# Patient Record
Sex: MALE | Race: ASIAN | Employment: FULL TIME | URBAN - METROPOLITAN AREA
[De-identification: names, ages, dates, MRNs, and addresses within clinical notes are randomized per-mention and may not be internally consistent; named-entity substitution may affect disease eponyms.]

---

## 2017-02-17 ENCOUNTER — HOSPITAL ENCOUNTER (EMERGENCY)
Facility: HOSPITAL | Age: 22
Discharge: HOME | End: 2017-02-17
Attending: EMERGENCY MEDICINE
Payer: COMMERCIAL

## 2017-02-17 VITALS
HEIGHT: 63 IN | DIASTOLIC BLOOD PRESSURE: 79 MMHG | BODY MASS INDEX: 30.48 KG/M2 | WEIGHT: 172 LBS | HEART RATE: 82 BPM | SYSTOLIC BLOOD PRESSURE: 136 MMHG | TEMPERATURE: 98 F | RESPIRATION RATE: 16 BRPM | OXYGEN SATURATION: 100 %

## 2017-02-17 DIAGNOSIS — S61.219A FINGER LACERATION, INITIAL ENCOUNTER: Primary | ICD-10-CM

## 2017-02-17 PROCEDURE — 99283 EMERGENCY DEPT VISIT LOW MDM: CPT

## 2017-02-17 PROCEDURE — 12002 RPR S/N/AX/GEN/TRNK2.6-7.5CM: CPT

## 2017-02-17 RX ORDER — IBUPROFEN 800 MG/1
800 TABLET ORAL ONCE
Status: COMPLETED | OUTPATIENT
Start: 2017-02-17 | End: 2017-02-17

## 2017-02-17 NOTE — ED PROVIDER NOTES
Patient Seen in: [facility] Emergency Department    History   Patient presents with:  Laceration Abrasion (integumentary)    Stated Complaint:     HPI    Patient is a 40-year-old who cut his left thumb with a kitchen knife this afternoon.   Patient i Well perfused, without cyanosis. No rashes. NEUROLOGIC: Cranial nerves II through XII are intact moving all extremities normally. No focal deficits visualized.         ED Course   Labs Reviewed - No data to display    MDM   PROCEDURE NOTE: LACERATION REP

## 2017-02-27 PROBLEM — S61.012A: Status: ACTIVE | Noted: 2017-02-27

## 2017-03-02 PROBLEM — S61.012A: Status: RESOLVED | Noted: 2017-02-27 | Resolved: 2017-03-02

## 2017-03-02 PROBLEM — S61.012D: Status: ACTIVE | Noted: 2017-03-02

## 2017-07-07 ENCOUNTER — LAB ENCOUNTER (OUTPATIENT)
Dept: LAB | Facility: HOSPITAL | Age: 22
End: 2017-07-07
Attending: SPECIALIST
Payer: COMMERCIAL

## 2017-07-07 DIAGNOSIS — Z00.00 ROUTINE GENERAL MEDICAL EXAMINATION AT A HEALTH CARE FACILITY: Primary | ICD-10-CM

## 2017-07-07 LAB
25-HYDROXYVITAMIN D (TOTAL): 23.5 NG/ML (ref 30–100)
ALBUMIN SERPL-MCNC: 4 G/DL (ref 3.5–4.8)
ALP LIVER SERPL-CCNC: 67 U/L (ref 45–117)
ALT SERPL-CCNC: 112 U/L (ref 17–63)
AST SERPL-CCNC: 33 U/L (ref 15–41)
BASOPHILS # BLD AUTO: 0.08 X10(3) UL (ref 0–0.1)
BASOPHILS NFR BLD AUTO: 1 %
BILIRUB SERPL-MCNC: 0.4 MG/DL (ref 0.1–2)
BILIRUB UR QL STRIP.AUTO: NEGATIVE
BUN BLD-MCNC: 16 MG/DL (ref 8–20)
CALCIUM BLD-MCNC: 9 MG/DL (ref 8.3–10.3)
CHLORIDE: 107 MMOL/L (ref 101–111)
CHOLEST SMN-MCNC: 177 MG/DL (ref ?–190)
CLARITY UR REFRACT.AUTO: CLEAR
CO2: 25 MMOL/L (ref 22–32)
COLOR UR AUTO: YELLOW
CREAT BLD-MCNC: 1.33 MG/DL (ref 0.7–1.3)
EOSINOPHIL # BLD AUTO: 0.59 X10(3) UL (ref 0–0.3)
EOSINOPHIL NFR BLD AUTO: 7.6 %
ERYTHROCYTE [DISTWIDTH] IN BLOOD BY AUTOMATED COUNT: 12.1 % (ref 11.5–16)
EST. AVERAGE GLUCOSE BLD GHB EST-MCNC: 114 MG/DL (ref 68–126)
GLUCOSE BLD-MCNC: 81 MG/DL (ref 70–99)
GLUCOSE UR STRIP.AUTO-MCNC: NEGATIVE MG/DL
HAV AB SERPL IA-ACNC: 481 PG/ML (ref 193–986)
HBA1C MFR BLD HPLC: 5.6 % (ref ?–5.7)
HCT VFR BLD AUTO: 44.5 % (ref 37–53)
HDLC SERPL-MCNC: 55 MG/DL (ref 45–?)
HDLC SERPL: 3.22 {RATIO} (ref ?–4.97)
HGB BLD-MCNC: 15 G/DL (ref 13–17)
IMMATURE GRANULOCYTE COUNT: 0.05 X10(3) UL (ref 0–1)
IMMATURE GRANULOCYTE RATIO %: 0.6 %
KETONES UR STRIP.AUTO-MCNC: NEGATIVE MG/DL
LDLC SERPL CALC-MCNC: 109 MG/DL (ref ?–120)
LEUKOCYTE ESTERASE UR QL STRIP.AUTO: NEGATIVE
LYMPHOCYTES # BLD AUTO: 2.37 X10(3) UL (ref 0.9–4)
LYMPHOCYTES NFR BLD AUTO: 30.5 %
M PROTEIN MFR SERPL ELPH: 8.1 G/DL (ref 6.1–8.3)
MCH RBC QN AUTO: 29.2 PG (ref 27–33.2)
MCHC RBC AUTO-ENTMCNC: 33.7 G/DL (ref 31–37)
MCV RBC AUTO: 86.7 FL (ref 80–99)
MONOCYTES # BLD AUTO: 0.75 X10(3) UL (ref 0.1–0.6)
MONOCYTES NFR BLD AUTO: 9.7 %
NEUTROPHIL ABS PRELIM: 3.92 X10 (3) UL (ref 1.3–6.7)
NEUTROPHILS # BLD AUTO: 3.92 X10(3) UL (ref 1.3–6.7)
NEUTROPHILS NFR BLD AUTO: 50.6 %
NITRITE UR QL STRIP.AUTO: NEGATIVE
NONHDLC SERPL-MCNC: 122 MG/DL (ref ?–150)
PH UR STRIP.AUTO: 5 [PH] (ref 4.5–8)
PLATELET # BLD AUTO: 368 10(3)UL (ref 150–450)
POTASSIUM SERPL-SCNC: 3.9 MMOL/L (ref 3.6–5.1)
PROT UR STRIP.AUTO-MCNC: NEGATIVE MG/DL
RBC # BLD AUTO: 5.13 X10(6)UL (ref 4.3–5.7)
RBC UR QL AUTO: NEGATIVE
RED CELL DISTRIBUTION WIDTH-SD: 38.5 FL (ref 35.1–46.3)
SODIUM SERPL-SCNC: 141 MMOL/L (ref 136–144)
SP GR UR STRIP.AUTO: 1.01 (ref 1–1.03)
THYROXINE (T4): 12.1 UG/DL (ref 4.5–10.9)
TRIGLYCERIDES: 67 MG/DL (ref ?–115)
TSI SER-ACNC: 2.29 MIU/ML (ref 0.35–5.5)
UROBILINOGEN UR STRIP.AUTO-MCNC: <2 MG/DL
VLDL: 13 MG/DL (ref 5–40)
WBC # BLD AUTO: 7.8 X10(3) UL (ref 4–13)

## 2017-07-07 PROCEDURE — 82306 VITAMIN D 25 HYDROXY: CPT

## 2017-07-07 PROCEDURE — 84443 ASSAY THYROID STIM HORMONE: CPT

## 2017-07-07 PROCEDURE — 80053 COMPREHEN METABOLIC PANEL: CPT

## 2017-07-07 PROCEDURE — 85025 COMPLETE CBC W/AUTO DIFF WBC: CPT

## 2017-07-07 PROCEDURE — 80061 LIPID PANEL: CPT

## 2017-07-07 PROCEDURE — 36415 COLL VENOUS BLD VENIPUNCTURE: CPT

## 2017-07-07 PROCEDURE — 84436 ASSAY OF TOTAL THYROXINE: CPT

## 2017-07-07 PROCEDURE — 83036 HEMOGLOBIN GLYCOSYLATED A1C: CPT

## 2017-07-07 PROCEDURE — 81003 URINALYSIS AUTO W/O SCOPE: CPT

## 2017-07-07 PROCEDURE — 82607 VITAMIN B-12: CPT

## 2017-07-07 PROCEDURE — 87086 URINE CULTURE/COLONY COUNT: CPT

## 2017-07-28 ENCOUNTER — LAB ENCOUNTER (OUTPATIENT)
Dept: LAB | Facility: HOSPITAL | Age: 22
End: 2017-07-28
Attending: SPECIALIST
Payer: COMMERCIAL

## 2017-07-28 DIAGNOSIS — G73.7 MYOPATHY IN ENDOCRINE DISEASES CLASSIFIED ELSEWHERE(359.5): ICD-10-CM

## 2017-07-28 DIAGNOSIS — E34.9 MYOPATHY IN ENDOCRINE DISEASES CLASSIFIED ELSEWHERE(359.5): ICD-10-CM

## 2017-07-28 DIAGNOSIS — R79.9 OTHER NONSPECIFIC FINDINGS ON EXAMINATION OF BLOOD(790.99): Primary | ICD-10-CM

## 2017-07-28 DIAGNOSIS — E55.9 AVITAMINOSIS D: ICD-10-CM

## 2017-07-28 LAB
25-HYDROXYVITAMIN D (TOTAL): 28.4 NG/ML (ref 30–100)
ALBUMIN SERPL-MCNC: 3.9 G/DL (ref 3.5–4.8)
ALP LIVER SERPL-CCNC: 68 U/L (ref 45–117)
ALT SERPL-CCNC: 85 U/L (ref 17–63)
ANTI-THYROPEROXIDASE: <28 U/ML (ref ?–60)
AST SERPL-CCNC: 31 U/L (ref 15–41)
BASOPHILS # BLD AUTO: 0.1 X10(3) UL (ref 0–0.1)
BASOPHILS NFR BLD AUTO: 1.4 %
BILIRUB SERPL-MCNC: 0.4 MG/DL (ref 0.1–2)
BUN BLD-MCNC: 20 MG/DL (ref 8–20)
C-REACTIVE PROTEIN: <0.29 MG/DL (ref ?–1)
CALCIUM BLD-MCNC: 9.2 MG/DL (ref 8.3–10.3)
CHLORIDE: 105 MMOL/L (ref 101–111)
CO2: 28 MMOL/L (ref 22–32)
CREAT BLD-MCNC: 1.35 MG/DL (ref 0.7–1.3)
EOSINOPHIL # BLD AUTO: 0.7 X10(3) UL (ref 0–0.3)
EOSINOPHIL NFR BLD AUTO: 9.7 %
ERYTHROCYTE [DISTWIDTH] IN BLOOD BY AUTOMATED COUNT: 11.9 % (ref 11.5–16)
GLUCOSE BLD-MCNC: 84 MG/DL (ref 70–99)
HAV AB SERPL IA-ACNC: 644 PG/ML (ref 193–986)
HCT VFR BLD AUTO: 47.9 % (ref 37–53)
HGB BLD-MCNC: 15.9 G/DL (ref 13–17)
IMMATURE GRANULOCYTE COUNT: 0.02 X10(3) UL (ref 0–1)
IMMATURE GRANULOCYTE RATIO %: 0.3 %
LYMPHOCYTES # BLD AUTO: 2.14 X10(3) UL (ref 0.9–4)
LYMPHOCYTES NFR BLD AUTO: 29.5 %
M PROTEIN MFR SERPL ELPH: 8.1 G/DL (ref 6.1–8.3)
MCH RBC QN AUTO: 28.5 PG (ref 27–33.2)
MCHC RBC AUTO-ENTMCNC: 33.2 G/DL (ref 31–37)
MCV RBC AUTO: 85.8 FL (ref 80–99)
MONOCYTES # BLD AUTO: 0.68 X10(3) UL (ref 0.1–0.6)
MONOCYTES NFR BLD AUTO: 9.4 %
NEUTROPHIL ABS PRELIM: 3.61 X10 (3) UL (ref 1.3–6.7)
NEUTROPHILS # BLD AUTO: 3.61 X10(3) UL (ref 1.3–6.7)
NEUTROPHILS NFR BLD AUTO: 49.7 %
PLATELET # BLD AUTO: 369 10(3)UL (ref 150–450)
POTASSIUM SERPL-SCNC: 4 MMOL/L (ref 3.6–5.1)
RBC # BLD AUTO: 5.58 X10(6)UL (ref 4.3–5.7)
RED CELL DISTRIBUTION WIDTH-SD: 37.5 FL (ref 35.1–46.3)
SODIUM SERPL-SCNC: 140 MMOL/L (ref 136–144)
TSI SER-ACNC: 0.96 MIU/ML (ref 0.35–5.5)
WBC # BLD AUTO: 7.3 X10(3) UL (ref 4–13)

## 2017-07-28 PROCEDURE — 80053 COMPREHEN METABOLIC PANEL: CPT

## 2017-07-28 PROCEDURE — 84443 ASSAY THYROID STIM HORMONE: CPT

## 2017-07-28 PROCEDURE — 36415 COLL VENOUS BLD VENIPUNCTURE: CPT

## 2017-07-28 PROCEDURE — 82607 VITAMIN B-12: CPT

## 2017-07-28 PROCEDURE — 85025 COMPLETE CBC W/AUTO DIFF WBC: CPT

## 2017-07-28 PROCEDURE — 86376 MICROSOMAL ANTIBODY EACH: CPT

## 2017-07-28 PROCEDURE — 82306 VITAMIN D 25 HYDROXY: CPT

## 2017-07-28 PROCEDURE — 86140 C-REACTIVE PROTEIN: CPT

## 2018-11-20 ENCOUNTER — IMMUNIZATION (OUTPATIENT)
Dept: FAMILY MEDICINE CLINIC | Facility: CLINIC | Age: 23
End: 2018-11-20
Payer: COMMERCIAL

## 2018-11-20 DIAGNOSIS — Z23 NEED FOR VACCINATION: ICD-10-CM

## 2018-11-20 PROCEDURE — 90471 IMMUNIZATION ADMIN: CPT | Performed by: PHYSICIAN ASSISTANT

## 2018-11-20 PROCEDURE — 90686 IIV4 VACC NO PRSV 0.5 ML IM: CPT | Performed by: PHYSICIAN ASSISTANT

## 2019-05-23 ENCOUNTER — APPOINTMENT (OUTPATIENT)
Dept: GENERAL RADIOLOGY | Age: 24
End: 2019-05-23
Attending: EMERGENCY MEDICINE
Payer: COMMERCIAL

## 2019-05-23 ENCOUNTER — HOSPITAL ENCOUNTER (OUTPATIENT)
Age: 24
Discharge: HOME | End: 2019-05-23
Attending: EMERGENCY MEDICINE
Payer: COMMERCIAL

## 2019-05-23 VITALS
SYSTOLIC BLOOD PRESSURE: 124 MMHG | OXYGEN SATURATION: 97 % | TEMPERATURE: 98 F | DIASTOLIC BLOOD PRESSURE: 69 MMHG | HEART RATE: 87 BPM | RESPIRATION RATE: 16 BRPM

## 2019-05-23 DIAGNOSIS — S63.502A SPRAIN OF LEFT WRIST, INITIAL ENCOUNTER: Primary | ICD-10-CM

## 2019-05-23 PROCEDURE — 99213 OFFICE O/P EST LOW 20 MIN: CPT

## 2019-05-23 PROCEDURE — 73110 X-RAY EXAM OF WRIST: CPT | Performed by: EMERGENCY MEDICINE
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
## 2019-05-30 ENCOUNTER — LAB ENCOUNTER (OUTPATIENT)
Dept: LAB | Facility: HOSPITAL | Age: 24
End: 2019-05-30
Attending: SPECIALIST
Payer: COMMERCIAL

## 2019-05-30 DIAGNOSIS — R79.9 ABNORMAL BLOOD CHEMISTRY: ICD-10-CM

## 2019-05-30 DIAGNOSIS — E55.9 AVITAMINOSIS D: ICD-10-CM

## 2019-05-30 DIAGNOSIS — R53.81 DEBILITY: ICD-10-CM

## 2019-05-30 DIAGNOSIS — Z00.00 ROUTINE GENERAL MEDICAL EXAMINATION AT A HEALTH CARE FACILITY: Primary | ICD-10-CM

## 2019-05-30 PROCEDURE — 36415 COLL VENOUS BLD VENIPUNCTURE: CPT

## 2019-05-30 PROCEDURE — 84436 ASSAY OF TOTAL THYROXINE: CPT

## 2019-05-30 PROCEDURE — 81001 URINALYSIS AUTO W/SCOPE: CPT

## 2019-05-30 PROCEDURE — 82607 VITAMIN B-12: CPT

## 2019-05-30 PROCEDURE — 87086 URINE CULTURE/COLONY COUNT: CPT

## 2019-05-30 PROCEDURE — 82746 ASSAY OF FOLIC ACID SERUM: CPT

## 2019-05-30 PROCEDURE — 83036 HEMOGLOBIN GLYCOSYLATED A1C: CPT

## 2019-05-30 PROCEDURE — 80053 COMPREHEN METABOLIC PANEL: CPT

## 2019-05-30 PROCEDURE — 82306 VITAMIN D 25 HYDROXY: CPT

## 2019-05-30 PROCEDURE — 84443 ASSAY THYROID STIM HORMONE: CPT

## 2019-05-30 PROCEDURE — 85025 COMPLETE CBC W/AUTO DIFF WBC: CPT

## 2019-05-30 PROCEDURE — 80061 LIPID PANEL: CPT

## 2019-06-04 ENCOUNTER — HOSPITAL ENCOUNTER (OUTPATIENT)
Dept: MRI IMAGING | Facility: HOSPITAL | Age: 24
Discharge: HOME | End: 2019-06-04
Attending: SPECIALIST
Payer: COMMERCIAL

## 2019-06-04 DIAGNOSIS — M25.532 LEFT WRIST PAIN: ICD-10-CM

## 2019-06-04 PROCEDURE — 73221 MRI JOINT UPR EXTREM W/O DYE: CPT | Performed by: SPECIALIST

## 2019-08-08 ENCOUNTER — HOSPITAL ENCOUNTER (OUTPATIENT)
Dept: GENERAL RADIOLOGY | Facility: HOSPITAL | Age: 24
Discharge: HOME | End: 2019-08-08
Attending: SPECIALIST
Payer: COMMERCIAL

## 2019-08-08 DIAGNOSIS — M25.562 BILATERAL KNEE PAIN: ICD-10-CM

## 2019-08-08 DIAGNOSIS — M25.561 BILATERAL KNEE PAIN: ICD-10-CM

## 2019-08-08 PROCEDURE — 73560 X-RAY EXAM OF KNEE 1 OR 2: CPT | Performed by: SPECIALIST

## 2020-06-04 ENCOUNTER — HOSPITAL ENCOUNTER (OUTPATIENT)
Dept: ULTRASOUND IMAGING | Age: 25
Discharge: HOME | End: 2020-06-04
Attending: SPECIALIST
Payer: COMMERCIAL

## 2020-06-04 DIAGNOSIS — N28.9 RENAL INSUFFICIENCY: ICD-10-CM

## 2020-06-04 PROCEDURE — 76770 US EXAM ABDO BACK WALL COMP: CPT | Performed by: SPECIALIST

## 2020-07-17 ENCOUNTER — HOSPITAL ENCOUNTER (OUTPATIENT)
Dept: ULTRASOUND IMAGING | Age: 25
Discharge: HOME | End: 2020-07-17
Attending: SPECIALIST
Payer: COMMERCIAL

## 2020-07-17 ENCOUNTER — LAB ENCOUNTER (OUTPATIENT)
Dept: LAB | Age: 25
End: 2020-07-17
Attending: SPECIALIST
Payer: COMMERCIAL

## 2020-07-17 DIAGNOSIS — Z00.00 ROUTINE GENERAL MEDICAL EXAMINATION AT A HEALTH CARE FACILITY: Primary | ICD-10-CM

## 2020-07-17 DIAGNOSIS — N13.30 HYDRONEPHROSIS, UNSPECIFIED HYDRONEPHROSIS TYPE: ICD-10-CM

## 2020-07-17 LAB
ALBUMIN SERPL-MCNC: 3.7 G/DL (ref 3.4–5)
ALBUMIN/GLOB SERPL: 0.9 {RATIO} (ref 1–2)
ALP LIVER SERPL-CCNC: 56 U/L (ref 45–117)
ALT SERPL-CCNC: 55 U/L (ref 16–61)
ANION GAP SERPL CALC-SCNC: 1 MMOL/L (ref 0–18)
AST SERPL-CCNC: 29 U/L (ref 15–37)
BASOPHILS # BLD AUTO: 0.08 X10(3) UL (ref 0–0.2)
BASOPHILS NFR BLD AUTO: 1.2 %
BILIRUB SERPL-MCNC: 0.5 MG/DL (ref 0.1–2)
BILIRUB UR QL STRIP.AUTO: NEGATIVE
BUN BLD-MCNC: 14 MG/DL (ref 7–18)
BUN/CREAT SERPL: 12.3 (ref 10–20)
CALCIUM BLD-MCNC: 8.9 MG/DL (ref 8.5–10.1)
CHLORIDE SERPL-SCNC: 107 MMOL/L (ref 98–112)
CHOLEST SMN-MCNC: 174 MG/DL (ref ?–200)
CLARITY UR REFRACT.AUTO: CLEAR
CO2 SERPL-SCNC: 29 MMOL/L (ref 21–32)
CREAT BLD-MCNC: 1.14 MG/DL (ref 0.7–1.3)
DEPRECATED RDW RBC AUTO: 42.7 FL (ref 35.1–46.3)
EOSINOPHIL # BLD AUTO: 0.83 X10(3) UL (ref 0–0.7)
EOSINOPHIL NFR BLD AUTO: 12.5 %
ERYTHROCYTE [DISTWIDTH] IN BLOOD BY AUTOMATED COUNT: 12.9 % (ref 11–15)
EST. AVERAGE GLUCOSE BLD GHB EST-MCNC: 103 MG/DL (ref 68–126)
FOLATE SERPL-MCNC: 19.4 NG/ML (ref 8.7–?)
GLOBULIN PLAS-MCNC: 3.9 G/DL (ref 2.8–4.4)
GLUCOSE BLD-MCNC: 85 MG/DL (ref 70–99)
GLUCOSE UR STRIP.AUTO-MCNC: NEGATIVE MG/DL
HBA1C MFR BLD HPLC: 5.2 % (ref ?–5.7)
HCT VFR BLD AUTO: 47.3 % (ref 39–53)
HDLC SERPL-MCNC: 55 MG/DL (ref 40–59)
HGB BLD-MCNC: 15.1 G/DL (ref 13–17.5)
IMM GRANULOCYTES # BLD AUTO: 0.02 X10(3) UL (ref 0–1)
IMM GRANULOCYTES NFR BLD: 0.3 %
KETONES UR STRIP.AUTO-MCNC: NEGATIVE MG/DL
LDLC SERPL CALC-MCNC: 104 MG/DL (ref ?–100)
LEUKOCYTE ESTERASE UR QL STRIP.AUTO: NEGATIVE
LYMPHOCYTES # BLD AUTO: 1.84 X10(3) UL (ref 1–4)
LYMPHOCYTES NFR BLD AUTO: 27.7 %
M PROTEIN MFR SERPL ELPH: 7.6 G/DL (ref 6.4–8.2)
MCH RBC QN AUTO: 28.7 PG (ref 26–34)
MCHC RBC AUTO-ENTMCNC: 31.9 G/DL (ref 31–37)
MCV RBC AUTO: 89.9 FL (ref 80–100)
MONOCYTES # BLD AUTO: 0.58 X10(3) UL (ref 0.1–1)
MONOCYTES NFR BLD AUTO: 8.7 %
NEUTROPHILS # BLD AUTO: 3.3 X10 (3) UL (ref 1.5–7.7)
NEUTROPHILS # BLD AUTO: 3.3 X10(3) UL (ref 1.5–7.7)
NEUTROPHILS NFR BLD AUTO: 49.6 %
NITRITE UR QL STRIP.AUTO: NEGATIVE
NONHDLC SERPL-MCNC: 119 MG/DL (ref ?–130)
OSMOLALITY SERPL CALC.SUM OF ELEC: 284 MOSM/KG (ref 275–295)
PATIENT FASTING Y/N/NP: YES
PATIENT FASTING Y/N/NP: YES
PH UR STRIP.AUTO: 8 [PH] (ref 4.5–8)
PLATELET # BLD AUTO: 335 10(3)UL (ref 150–450)
POTASSIUM SERPL-SCNC: 4.1 MMOL/L (ref 3.5–5.1)
PROT UR STRIP.AUTO-MCNC: NEGATIVE MG/DL
RBC # BLD AUTO: 5.26 X10(6)UL (ref 4.3–5.7)
RBC UR QL AUTO: NEGATIVE
SED RATE-ML: 9 MM/HR (ref 0–12)
SODIUM SERPL-SCNC: 137 MMOL/L (ref 136–145)
SP GR UR STRIP.AUTO: 1.01 (ref 1–1.03)
T4 SERPL-MCNC: 9.7 UG/DL (ref 4.5–12.1)
TRIGL SERPL-MCNC: 73 MG/DL (ref 30–149)
TSI SER-ACNC: 1.14 MIU/ML (ref 0.36–3.74)
UROBILINOGEN UR STRIP.AUTO-MCNC: <2 MG/DL
VIT B12 SERPL-MCNC: 435 PG/ML (ref 193–986)
VIT D+METAB SERPL-MCNC: 31.8 NG/ML (ref 30–100)
VLDLC SERPL CALC-MCNC: 15 MG/DL (ref 0–30)
WBC # BLD AUTO: 6.7 X10(3) UL (ref 4–11)

## 2020-07-17 PROCEDURE — 80053 COMPREHEN METABOLIC PANEL: CPT

## 2020-07-17 PROCEDURE — 83036 HEMOGLOBIN GLYCOSYLATED A1C: CPT

## 2020-07-17 PROCEDURE — 85025 COMPLETE CBC W/AUTO DIFF WBC: CPT

## 2020-07-17 PROCEDURE — 81003 URINALYSIS AUTO W/O SCOPE: CPT

## 2020-07-17 PROCEDURE — 82746 ASSAY OF FOLIC ACID SERUM: CPT

## 2020-07-17 PROCEDURE — 85652 RBC SED RATE AUTOMATED: CPT

## 2020-07-17 PROCEDURE — 76770 US EXAM ABDO BACK WALL COMP: CPT | Performed by: SPECIALIST

## 2020-07-17 PROCEDURE — 84443 ASSAY THYROID STIM HORMONE: CPT

## 2020-07-17 PROCEDURE — 84436 ASSAY OF TOTAL THYROXINE: CPT

## 2020-07-17 PROCEDURE — 82306 VITAMIN D 25 HYDROXY: CPT

## 2020-07-17 PROCEDURE — 87086 URINE CULTURE/COLONY COUNT: CPT

## 2020-07-17 PROCEDURE — 80061 LIPID PANEL: CPT

## 2020-07-17 PROCEDURE — 36415 COLL VENOUS BLD VENIPUNCTURE: CPT

## 2020-07-17 PROCEDURE — 82607 VITAMIN B-12: CPT

## 2020-07-27 ENCOUNTER — HOSPITAL ENCOUNTER (OUTPATIENT)
Dept: CT IMAGING | Age: 25
Discharge: HOME | End: 2020-07-27
Attending: UROLOGY
Payer: COMMERCIAL

## 2020-07-27 DIAGNOSIS — N13.30 HYDRONEPHROSIS, RIGHT: ICD-10-CM

## 2020-07-27 PROCEDURE — 74178 CT ABD&PLV WO CNTR FLWD CNTR: CPT | Performed by: UROLOGY

## 2020-07-27 PROCEDURE — 76377 3D RENDER W/INTRP POSTPROCES: CPT | Performed by: UROLOGY

## 2020-08-01 ENCOUNTER — TELEPHONE (OUTPATIENT)
Dept: INTERNAL MEDICINE CLINIC | Facility: HOSPITAL | Age: 25
End: 2020-08-01

## 2020-08-01 ENCOUNTER — TELEPHONE (OUTPATIENT)
Dept: OTHER | Facility: HOSPITAL | Age: 25
End: 2020-08-01

## 2020-08-01 ENCOUNTER — LAB ENCOUNTER (OUTPATIENT)
Dept: LAB | Facility: HOSPITAL | Age: 25
End: 2020-08-01
Attending: PREVENTIVE MEDICINE
Payer: COMMERCIAL

## 2020-08-01 DIAGNOSIS — Z20.822 SUSPECTED COVID-19 VIRUS INFECTION: ICD-10-CM

## 2020-08-01 DIAGNOSIS — Z20.822 SUSPECTED COVID-19 VIRUS INFECTION: Primary | ICD-10-CM

## 2020-08-01 LAB — SARS-COV-2 RNA RESP QL NAA+PROBE: DETECTED

## 2020-08-01 NOTE — TELEPHONE ENCOUNTER
Employee notified of Detected Covid results - Voiced understanding  Has headaches sore throat . Advise to seek treatment if worsening symptoms or shortness of breath , chest pain voices understanding  Advise masking , isolation, separate bathrooms. Schedul

## 2020-08-03 NOTE — IMMEDIATE CARE/WORKERS COMP PHYSICIAN REPORT
Called employee (verified by 2 identifiers) to give POSITIVE results of COVID test that were reviewed by Dr. Jesus Davis.     Per [facility] positive COVID guidelines, employee informed of the following per [facility] guidelines:    • Employee advised to communicate

## 2020-09-14 ENCOUNTER — ORDER TRANSCRIPTION (OUTPATIENT)
Dept: ADMINISTRATIVE | Facility: HOSPITAL | Age: 25
End: 2020-09-14

## 2020-09-14 DIAGNOSIS — J40 BRONCHITIS: Primary | ICD-10-CM

## 2020-09-15 ENCOUNTER — HOSPITAL ENCOUNTER (OUTPATIENT)
Dept: GENERAL RADIOLOGY | Age: 25
Discharge: HOME | End: 2020-09-15
Attending: SPECIALIST
Payer: COMMERCIAL

## 2020-09-15 ENCOUNTER — LAB ENCOUNTER (OUTPATIENT)
Dept: LAB | Age: 25
End: 2020-09-15
Attending: SPECIALIST
Payer: COMMERCIAL

## 2020-09-15 ENCOUNTER — EKG ENCOUNTER (OUTPATIENT)
Dept: LAB | Age: 25
End: 2020-09-15
Attending: SPECIALIST
Payer: COMMERCIAL

## 2020-09-15 DIAGNOSIS — R79.9 ABNORMAL BLOOD CHEMISTRY: ICD-10-CM

## 2020-09-15 DIAGNOSIS — E55.9 AVITAMINOSIS D: ICD-10-CM

## 2020-09-15 DIAGNOSIS — J40 BRONCHITIS: ICD-10-CM

## 2020-09-15 DIAGNOSIS — R06.00 DYSPNEA: Primary | ICD-10-CM

## 2020-09-15 DIAGNOSIS — Z00.00 ROUTINE GENERAL MEDICAL EXAMINATION AT A HEALTH CARE FACILITY: Primary | ICD-10-CM

## 2020-09-15 DIAGNOSIS — R53.81 DEBILITY: ICD-10-CM

## 2020-09-15 LAB
ALBUMIN SERPL-MCNC: 3.9 G/DL (ref 3.4–5)
ALBUMIN/GLOB SERPL: 0.9 {RATIO} (ref 1–2)
ALP LIVER SERPL-CCNC: 57 U/L (ref 45–117)
ALT SERPL-CCNC: 37 U/L (ref 16–61)
ANION GAP SERPL CALC-SCNC: 5 MMOL/L (ref 0–18)
AST SERPL-CCNC: 20 U/L (ref 15–37)
ATRIAL RATE: 59 BPM
BASOPHILS # BLD AUTO: 0.08 X10(3) UL (ref 0–0.2)
BASOPHILS NFR BLD AUTO: 1.2 %
BILIRUB SERPL-MCNC: 0.8 MG/DL (ref 0.1–2)
BILIRUB UR QL STRIP.AUTO: NEGATIVE
BUN BLD-MCNC: 12 MG/DL (ref 7–18)
BUN/CREAT SERPL: 10.3 (ref 10–20)
CALCIUM BLD-MCNC: 9.2 MG/DL (ref 8.5–10.1)
CHLORIDE SERPL-SCNC: 104 MMOL/L (ref 98–112)
CHOLEST SMN-MCNC: 188 MG/DL (ref ?–200)
CLARITY UR REFRACT.AUTO: CLEAR
CO2 SERPL-SCNC: 28 MMOL/L (ref 21–32)
COLOR UR AUTO: YELLOW
CREAT BLD-MCNC: 1.17 MG/DL (ref 0.7–1.3)
DEPRECATED RDW RBC AUTO: 42.9 FL (ref 35.1–46.3)
EOSINOPHIL # BLD AUTO: 0.25 X10(3) UL (ref 0–0.7)
EOSINOPHIL NFR BLD AUTO: 3.9 %
ERYTHROCYTE [DISTWIDTH] IN BLOOD BY AUTOMATED COUNT: 12.5 % (ref 11–15)
EST. AVERAGE GLUCOSE BLD GHB EST-MCNC: 114 MG/DL (ref 68–126)
FOLATE SERPL-MCNC: 30.8 NG/ML (ref 8.7–?)
GLOBULIN PLAS-MCNC: 4.2 G/DL (ref 2.8–4.4)
GLUCOSE BLD-MCNC: 83 MG/DL (ref 70–99)
GLUCOSE UR STRIP.AUTO-MCNC: NEGATIVE MG/DL
HBA1C MFR BLD HPLC: 5.6 % (ref ?–5.7)
HCT VFR BLD AUTO: 49.7 % (ref 39–53)
HDLC SERPL-MCNC: 58 MG/DL (ref 40–59)
HGB BLD-MCNC: 15.4 G/DL (ref 13–17.5)
IMM GRANULOCYTES # BLD AUTO: 0.03 X10(3) UL (ref 0–1)
IMM GRANULOCYTES NFR BLD: 0.5 %
KETONES UR STRIP.AUTO-MCNC: 20 MG/DL
LDLC SERPL CALC-MCNC: 113 MG/DL (ref ?–100)
LEUKOCYTE ESTERASE UR QL STRIP.AUTO: NEGATIVE
LYMPHOCYTES # BLD AUTO: 1.73 X10(3) UL (ref 1–4)
LYMPHOCYTES NFR BLD AUTO: 26.8 %
M PROTEIN MFR SERPL ELPH: 8.1 G/DL (ref 6.4–8.2)
MCH RBC QN AUTO: 28.7 PG (ref 26–34)
MCHC RBC AUTO-ENTMCNC: 31 G/DL (ref 31–37)
MCV RBC AUTO: 92.6 FL (ref 80–100)
MONOCYTES # BLD AUTO: 0.58 X10(3) UL (ref 0.1–1)
MONOCYTES NFR BLD AUTO: 9 %
NEUTROPHILS # BLD AUTO: 3.78 X10 (3) UL (ref 1.5–7.7)
NEUTROPHILS # BLD AUTO: 3.78 X10(3) UL (ref 1.5–7.7)
NEUTROPHILS NFR BLD AUTO: 58.6 %
NITRITE UR QL STRIP.AUTO: NEGATIVE
NONHDLC SERPL-MCNC: 130 MG/DL (ref ?–130)
OSMOLALITY SERPL CALC.SUM OF ELEC: 283 MOSM/KG (ref 275–295)
P AXIS: 33 DEGREES
P-R INTERVAL: 136 MS
PATIENT FASTING Y/N/NP: YES
PATIENT FASTING Y/N/NP: YES
PH UR STRIP.AUTO: 6 [PH] (ref 4.5–8)
PLATELET # BLD AUTO: 385 10(3)UL (ref 150–450)
POTASSIUM SERPL-SCNC: 3.9 MMOL/L (ref 3.5–5.1)
PROT UR STRIP.AUTO-MCNC: NEGATIVE MG/DL
Q-T INTERVAL: 390 MS
QRS DURATION: 90 MS
QTC CALCULATION (BEZET): 386 MS
R AXIS: 57 DEGREES
RBC # BLD AUTO: 5.37 X10(6)UL (ref 4.3–5.7)
RBC UR QL AUTO: NEGATIVE
SODIUM SERPL-SCNC: 137 MMOL/L (ref 136–145)
SP GR UR STRIP.AUTO: 1.02 (ref 1–1.03)
T AXIS: 27 DEGREES
T4 SERPL-MCNC: 10.3 UG/DL (ref 4.5–12.1)
TRIGL SERPL-MCNC: 84 MG/DL (ref 30–149)
TSI SER-ACNC: 0.76 MIU/ML (ref 0.36–3.74)
UROBILINOGEN UR STRIP.AUTO-MCNC: <2 MG/DL
VENTRICULAR RATE: 59 BPM
VIT B12 SERPL-MCNC: 1009 PG/ML (ref 193–986)
VIT D+METAB SERPL-MCNC: 33.3 NG/ML (ref 30–100)
VLDLC SERPL CALC-MCNC: 17 MG/DL (ref 0–30)
WBC # BLD AUTO: 6.5 X10(3) UL (ref 4–11)

## 2020-09-15 PROCEDURE — 84443 ASSAY THYROID STIM HORMONE: CPT

## 2020-09-15 PROCEDURE — 81003 URINALYSIS AUTO W/O SCOPE: CPT

## 2020-09-15 PROCEDURE — 80061 LIPID PANEL: CPT

## 2020-09-15 PROCEDURE — 82306 VITAMIN D 25 HYDROXY: CPT

## 2020-09-15 PROCEDURE — 71046 X-RAY EXAM CHEST 2 VIEWS: CPT | Performed by: SPECIALIST

## 2020-09-15 PROCEDURE — 36415 COLL VENOUS BLD VENIPUNCTURE: CPT

## 2020-09-15 PROCEDURE — 85025 COMPLETE CBC W/AUTO DIFF WBC: CPT

## 2020-09-15 PROCEDURE — 80053 COMPREHEN METABOLIC PANEL: CPT

## 2020-09-15 PROCEDURE — 84436 ASSAY OF TOTAL THYROXINE: CPT

## 2020-09-15 PROCEDURE — 83036 HEMOGLOBIN GLYCOSYLATED A1C: CPT

## 2020-09-15 PROCEDURE — 82746 ASSAY OF FOLIC ACID SERUM: CPT

## 2020-09-15 PROCEDURE — 82607 VITAMIN B-12: CPT

## 2020-09-15 PROCEDURE — 93010 ELECTROCARDIOGRAM REPORT: CPT | Performed by: INTERNAL MEDICINE

## 2020-09-15 PROCEDURE — 93005 ELECTROCARDIOGRAM TRACING: CPT

## 2020-09-30 ENCOUNTER — LAB ENCOUNTER (OUTPATIENT)
Dept: LAB | Age: 25
End: 2020-09-30
Attending: SPECIALIST
Payer: COMMERCIAL

## 2020-09-30 ENCOUNTER — OFFICE VISIT (OUTPATIENT)
Dept: OCCUPATIONAL MEDICINE | Age: 25
End: 2020-09-30
Attending: EMERGENCY MEDICINE

## 2020-09-30 DIAGNOSIS — Z00.00 ROUTINE GENERAL MEDICAL EXAMINATION AT A HEALTH CARE FACILITY: Primary | ICD-10-CM

## 2020-09-30 PROCEDURE — 86706 HEP B SURFACE ANTIBODY: CPT

## 2020-09-30 PROCEDURE — 36415 COLL VENOUS BLD VENIPUNCTURE: CPT

## 2020-09-30 PROCEDURE — 86787 VARICELLA-ZOSTER ANTIBODY: CPT

## 2020-09-30 PROCEDURE — 86735 MUMPS ANTIBODY: CPT

## 2020-09-30 PROCEDURE — 86765 RUBEOLA ANTIBODY: CPT

## 2020-09-30 PROCEDURE — 86762 RUBELLA ANTIBODY: CPT

## 2020-10-02 ENCOUNTER — OCC HEALTH (OUTPATIENT)
Dept: OCCUPATIONAL MEDICINE | Age: 25
End: 2020-10-02
Attending: EMERGENCY MEDICINE

## 2020-10-05 ENCOUNTER — LAB ENCOUNTER (OUTPATIENT)
Dept: LAB | Age: 25
End: 2020-10-05
Attending: SPECIALIST
Payer: COMMERCIAL

## 2020-10-05 DIAGNOSIS — Z00.00 ROUTINE GENERAL MEDICAL EXAMINATION AT A HEALTH CARE FACILITY: Primary | ICD-10-CM

## 2020-10-05 PROCEDURE — 36415 COLL VENOUS BLD VENIPUNCTURE: CPT

## 2020-10-05 PROCEDURE — 86480 TB TEST CELL IMMUN MEASURE: CPT

## 2021-01-27 ENCOUNTER — IMMUNIZATION (OUTPATIENT)
Dept: LAB | Facility: HOSPITAL | Age: 26
End: 2021-01-27
Attending: EMERGENCY MEDICINE

## 2021-01-27 DIAGNOSIS — Z23 NEED FOR VACCINATION: Primary | ICD-10-CM

## 2021-01-27 PROCEDURE — 0011A SARSCOV2 VAC 100MCG/0.5ML IM: CPT

## 2021-02-24 ENCOUNTER — IMMUNIZATION (OUTPATIENT)
Dept: LAB | Facility: HOSPITAL | Age: 26
End: 2021-02-24
Attending: PREVENTIVE MEDICINE
Payer: COMMERCIAL

## 2021-02-24 DIAGNOSIS — Z23 NEED FOR VACCINATION: Primary | ICD-10-CM

## 2021-02-24 PROCEDURE — 0012A SARSCOV2 VAC 100MCG/0.5ML IM: CPT

## 2021-08-30 ENCOUNTER — OFFICE VISIT (OUTPATIENT)
Dept: INTERNAL MEDICINE CLINIC | Facility: CLINIC | Age: 26
End: 2021-08-30
Payer: COMMERCIAL

## 2021-08-30 VITALS
TEMPERATURE: 98 F | WEIGHT: 177.63 LBS | DIASTOLIC BLOOD PRESSURE: 64 MMHG | HEART RATE: 64 BPM | BODY MASS INDEX: 31.87 KG/M2 | SYSTOLIC BLOOD PRESSURE: 90 MMHG | HEIGHT: 62.5 IN | RESPIRATION RATE: 12 BRPM

## 2021-08-30 DIAGNOSIS — Z52.4 KIDNEY DONOR: ICD-10-CM

## 2021-08-30 DIAGNOSIS — Z00.00 PHYSICAL EXAM, ANNUAL: Primary | ICD-10-CM

## 2021-08-30 PROCEDURE — 3078F DIAST BP <80 MM HG: CPT | Performed by: INTERNAL MEDICINE

## 2021-08-30 PROCEDURE — 99385 PREV VISIT NEW AGE 18-39: CPT | Performed by: INTERNAL MEDICINE

## 2021-08-30 PROCEDURE — 3074F SYST BP LT 130 MM HG: CPT | Performed by: INTERNAL MEDICINE

## 2021-08-30 PROCEDURE — 3008F BODY MASS INDEX DOCD: CPT | Performed by: INTERNAL MEDICINE

## 2021-08-30 NOTE — PROGRESS NOTES
[facility]    CHIEF COMPLAINT: Patient presents with:  Routine Physical  Note         HPI:   Sergio Mayfield is a 31year old male who presents for a complete physical exam.      Pt is a kidney donor, donated left kidney to father.  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 : single. Exercise: stairmaster, treadmill.   Diet: watches calories closely     REVIEW OF SYSTEMS:   GE 10:41 AM    CO2 28.0 09/15/2020 10:41 AM    CREATSERUM 1.17 09/15/2020 10:41 AM    CA 9.2 09/15/2020 10:41 AM    ALB 3.9 09/15/2020 10:41 AM    TP 8.1 09/15/2020 10:41 AM    ALKPHO 57 09/15/2020 10:41 AM    AST 20 09/15/2020 10:41 AM    ALT 37 09/15/2020 1

## 2021-08-31 ENCOUNTER — LAB ENCOUNTER (OUTPATIENT)
Dept: LAB | Age: 26
End: 2021-08-31
Attending: INTERNAL MEDICINE
Payer: COMMERCIAL

## 2021-08-31 DIAGNOSIS — Z00.00 PHYSICAL EXAM, ANNUAL: ICD-10-CM

## 2021-08-31 LAB
ALBUMIN SERPL-MCNC: 3.9 G/DL (ref 3.4–5)
ALBUMIN/GLOB SERPL: 0.9 {RATIO} (ref 1–2)
ALP LIVER SERPL-CCNC: 54 U/L
ALT SERPL-CCNC: 57 U/L
ANION GAP SERPL CALC-SCNC: 8 MMOL/L (ref 0–18)
AST SERPL-CCNC: 25 U/L (ref 15–37)
BASOPHILS # BLD AUTO: 0.09 X10(3) UL (ref 0–0.2)
BASOPHILS NFR BLD AUTO: 1.3 %
BILIRUB SERPL-MCNC: 0.3 MG/DL (ref 0.1–2)
BUN BLD-MCNC: 19 MG/DL (ref 7–18)
CALCIUM BLD-MCNC: 9 MG/DL (ref 8.5–10.1)
CHLORIDE SERPL-SCNC: 107 MMOL/L (ref 98–112)
CHOLEST SMN-MCNC: 195 MG/DL (ref ?–200)
CO2 SERPL-SCNC: 23 MMOL/L (ref 21–32)
CREAT BLD-MCNC: 1.16 MG/DL
EOSINOPHIL # BLD AUTO: 0.45 X10(3) UL (ref 0–0.7)
EOSINOPHIL NFR BLD AUTO: 6.6 %
ERYTHROCYTE [DISTWIDTH] IN BLOOD BY AUTOMATED COUNT: 12.1 %
EST. AVERAGE GLUCOSE BLD GHB EST-MCNC: 120 MG/DL (ref 68–126)
GLOBULIN PLAS-MCNC: 4.2 G/DL (ref 2.8–4.4)
GLUCOSE BLD-MCNC: 83 MG/DL (ref 70–99)
HBA1C MFR BLD HPLC: 5.8 % (ref ?–5.7)
HCT VFR BLD AUTO: 48.7 %
HDLC SERPL-MCNC: 48 MG/DL (ref 40–59)
HGB BLD-MCNC: 16 G/DL
IMM GRANULOCYTES # BLD AUTO: 0.03 X10(3) UL (ref 0–1)
IMM GRANULOCYTES NFR BLD: 0.4 %
LDLC SERPL CALC-MCNC: 126 MG/DL (ref ?–100)
LYMPHOCYTES # BLD AUTO: 2.08 X10(3) UL (ref 1–4)
LYMPHOCYTES NFR BLD AUTO: 30.3 %
M PROTEIN MFR SERPL ELPH: 8.1 G/DL (ref 6.4–8.2)
MCH RBC QN AUTO: 28.9 PG (ref 26–34)
MCHC RBC AUTO-ENTMCNC: 32.9 G/DL (ref 31–37)
MCV RBC AUTO: 88.1 FL
MONOCYTES # BLD AUTO: 0.62 X10(3) UL (ref 0.1–1)
MONOCYTES NFR BLD AUTO: 9 %
NEUTROPHILS # BLD AUTO: 3.6 X10 (3) UL (ref 1.5–7.7)
NEUTROPHILS # BLD AUTO: 3.6 X10(3) UL (ref 1.5–7.7)
NEUTROPHILS NFR BLD AUTO: 52.4 %
NONHDLC SERPL-MCNC: 147 MG/DL (ref ?–130)
OSMOLALITY SERPL CALC.SUM OF ELEC: 287 MOSM/KG (ref 275–295)
PATIENT FASTING Y/N/NP: YES
PATIENT FASTING Y/N/NP: YES
PLATELET # BLD AUTO: 357 10(3)UL (ref 150–450)
POTASSIUM SERPL-SCNC: 4.1 MMOL/L (ref 3.5–5.1)
RBC # BLD AUTO: 5.53 X10(6)UL
SODIUM SERPL-SCNC: 138 MMOL/L (ref 136–145)
TRIGL SERPL-MCNC: 118 MG/DL (ref 30–149)
TSI SER-ACNC: 1.3 MIU/ML (ref 0.36–3.74)
VLDLC SERPL CALC-MCNC: 21 MG/DL (ref 0–30)
WBC # BLD AUTO: 6.9 X10(3) UL (ref 4–11)

## 2021-08-31 PROCEDURE — 80053 COMPREHEN METABOLIC PANEL: CPT

## 2021-08-31 PROCEDURE — 85025 COMPLETE CBC W/AUTO DIFF WBC: CPT

## 2021-08-31 PROCEDURE — 83036 HEMOGLOBIN GLYCOSYLATED A1C: CPT

## 2021-08-31 PROCEDURE — 80061 LIPID PANEL: CPT

## 2021-08-31 PROCEDURE — 84443 ASSAY THYROID STIM HORMONE: CPT

## 2021-08-31 PROCEDURE — 36415 COLL VENOUS BLD VENIPUNCTURE: CPT

## 2021-09-09 ENCOUNTER — OFFICE VISIT (OUTPATIENT)
Dept: SURGERY | Facility: CLINIC | Age: 26
End: 2021-09-09
Payer: COMMERCIAL

## 2021-09-09 ENCOUNTER — TELEPHONE (OUTPATIENT)
Dept: SURGERY | Facility: CLINIC | Age: 26
End: 2021-09-09

## 2021-09-09 DIAGNOSIS — Q62.39 UPJ OBSTRUCTION, CONGENITAL: Primary | ICD-10-CM

## 2021-09-09 DIAGNOSIS — IMO0002 SOLITARY KIDNEY: ICD-10-CM

## 2021-09-09 LAB
APPEARANCE: CLEAR
BILIRUBIN: NEGATIVE
GLUCOSE (URINE DIPSTICK): NEGATIVE MG/DL
KETONES (URINE DIPSTICK): NEGATIVE MG/DL
LEUKOCYTES: NEGATIVE
MULTISTIX LOT#: NORMAL NUMERIC
NITRITE, URINE: NEGATIVE
OCCULT BLOOD: NEGATIVE
PH, URINE: 6 (ref 4.5–8)
PROTEIN (URINE DIPSTICK): NEGATIVE MG/DL
SPECIFIC GRAVITY: 1.01 (ref 1–1.03)
UROBILINOGEN,SEMI-QN: 0.2 MG/DL (ref 0–1.9)

## 2021-09-09 PROCEDURE — 81003 URINALYSIS AUTO W/O SCOPE: CPT | Performed by: PHYSICIAN ASSISTANT

## 2021-09-09 PROCEDURE — 99243 OFF/OP CNSLTJ NEW/EST LOW 30: CPT | Performed by: PHYSICIAN ASSISTANT

## 2021-09-09 NOTE — PROGRESS NOTES
[address], [address] [facility]    Urology Consult Note    History of Present Illness:   Patient is a 31year old male with solitary kidney following left renal transplant to his father at age 23, who presents today for consultation and possibl of systems was completed and is negative other than as noted above. Physical Exam:   There were no vitals taken for this visit.     GENERAL APPEARANCE: well developed, well nourished, in no acute distress  NEUROLOGIC: no localizing neurologic signs, rustam URINECUL 30,000 CFU/ML Mixed gram positive gaston 07/07/2017       Imaging  No results found.       Impression:     Patient is a 31year old male with solitary kidney who presents today for consultation regarding possible UPJO and medical clearance for US Ar

## 2021-09-09 NOTE — TELEPHONE ENCOUNTER
S/w patient. Reviewed I would not recommend repeat CTU at this time.   As I was speaking with patient he received email from his insurance that they had provided incorrect information and provided new estimate for cost.   He will notify me if he needs any

## 2021-09-09 NOTE — TELEPHONE ENCOUNTER
Patient states that order placed is not covered by insurance. Would like to know if she can place a CT urogram instead to confirm is swollen kidney is gone. Patient states she can leave a detailed voice message.   Please advise

## 2021-09-23 ENCOUNTER — HOSPITAL ENCOUNTER (OUTPATIENT)
Dept: NUCLEAR MEDICINE | Facility: HOSPITAL | Age: 26
Discharge: HOME | End: 2021-09-23
Attending: PHYSICIAN ASSISTANT
Payer: COMMERCIAL

## 2021-09-23 ENCOUNTER — PATIENT MESSAGE (OUTPATIENT)
Dept: SURGERY | Facility: CLINIC | Age: 26
End: 2021-09-23

## 2021-09-23 DIAGNOSIS — Q62.39 UPJ OBSTRUCTION, CONGENITAL: ICD-10-CM

## 2021-09-23 DIAGNOSIS — IMO0002 SOLITARY KIDNEY: ICD-10-CM

## 2021-09-23 PROCEDURE — 78708 K FLOW/FUNCT IMAGE W/DRUG: CPT | Performed by: PHYSICIAN ASSISTANT

## 2021-09-23 RX ORDER — FUROSEMIDE 10 MG/ML
INJECTION INTRAMUSCULAR; INTRAVENOUS
Status: COMPLETED
Start: 2021-09-23 | End: 2021-09-23

## 2021-09-23 RX ORDER — FUROSEMIDE 10 MG/ML
40 INJECTION INTRAMUSCULAR; INTRAVENOUS ONCE
Status: COMPLETED | OUTPATIENT
Start: 2021-09-23 | End: 2021-09-23

## 2021-09-23 RX ADMIN — FUROSEMIDE 40 MG: 10 INJECTION INTRAMUSCULAR; INTRAVENOUS at 10:34:00

## 2021-09-23 NOTE — IMAGING NOTE
1107 Pt  to ultrasound [facility] department scouts completed by Cecelia Ly       1108 Hx taken procedure explained questions answered.   Iv patent no redness or swelling noted at site with 950 ml noted in bag    1110 Consent signed and verified

## 2021-09-24 ENCOUNTER — PATIENT MESSAGE (OUTPATIENT)
Dept: SURGERY | Facility: CLINIC | Age: 26
End: 2021-09-24

## 2021-09-24 NOTE — TELEPHONE ENCOUNTER
From: Curtis Amanda  To:  Rich Gregorio PA-C  Sent: 9/23/2021 2:46 PM CDT  Subject: Question regarding Nuclear Medicine Scan    Hello,    I was wondering if I can just have you write out a sentence or two sentence that just says along the lines o

## 2021-09-27 ENCOUNTER — PATIENT MESSAGE (OUTPATIENT)
Dept: SURGERY | Facility: CLINIC | Age: 26
End: 2021-09-27

## 2021-09-27 ENCOUNTER — TELEPHONE (OUTPATIENT)
Dept: SURGERY | Facility: CLINIC | Age: 26
End: 2021-09-27

## 2021-09-27 NOTE — TELEPHONE ENCOUNTER
From: Dianna Boyd  Sent: 9/27/2021 5:36 AM CDT  To: [facility] Clinical Staff  Subject: Question regarding Nuclear Medicine Scan    Hello,    I just wanted to follow up with the letter from you whenever possible. Thank you very much!

## 2021-09-27 NOTE — TELEPHONE ENCOUNTER
From: Jose Granados  Sent: 9/27/2021 4:07 PM CDT  To: [facility] Clinical Staff  Subject: Question regarding Nuclear Medicine Scan    Hello,    Hi, I see the letter and thank you!  I do however, prefer to have a PDF or printable version of this letter

## 2021-10-19 ENCOUNTER — HOSPITAL ENCOUNTER (EMERGENCY)
Facility: HOSPITAL | Age: 26
Discharge: HOME | End: 2021-10-19
Payer: COMMERCIAL

## 2021-10-19 ENCOUNTER — TELEPHONE (OUTPATIENT)
Dept: INTERNAL MEDICINE CLINIC | Facility: HOSPITAL | Age: 26
End: 2021-10-19

## 2021-10-19 DIAGNOSIS — Z20.822 SUSPECTED COVID-19 VIRUS INFECTION: Primary | ICD-10-CM

## 2021-10-19 NOTE — TELEPHONE ENCOUNTER
Department:Geriatric Unit        [x] [facility]  []GABRIEL   [] [address] [facility]    Dept Manager/Supervisor/team or clinical lead: Lajoyce Blizzard  Position:  [] MD     [] RN     [] Respiratory Therapist     [] PCT     [] PSR      [x] [address] 10/18  When are you next scheduled to work? 10/20    Did you have close contact with someone on your unit while not wearing a mask? (e.g., during meal breaks):  Yes []   No [x]    If yes, who:   Do you share a workspace?  Yes []   No [x]       If yes, with COVID-19 testing ordered: [x] Rapid    [] [facility]    Date test is to be taken:    10/19/2021    []  Outside testing       [x] Manager notified    INSTRUCTIONS PROVIDED:    [x] Employee will schedule testing via [employer] or call Central

## 2021-10-23 NOTE — TELEPHONE ENCOUNTER
Results and RTW guidelines:    COVID RESULT:    [x] Viewed by employee in [address] RTW plan and instructions as indicated on triage call. Manager notified. Estimated RTW date: 10/20/21  [] Discussed with employee   [] Unable to reach by phone.   Sent v

## 2021-10-27 ENCOUNTER — NURSE ONLY (OUTPATIENT)
Dept: LAB | Facility: HOSPITAL | Age: 26
End: 2021-10-27
Attending: PREVENTIVE MEDICINE
Payer: COMMERCIAL

## 2021-10-27 ENCOUNTER — TELEPHONE (OUTPATIENT)
Dept: INTERNAL MEDICINE CLINIC | Facility: HOSPITAL | Age: 26
End: 2021-10-27

## 2021-10-27 DIAGNOSIS — Z20.822 SUSPECTED COVID-19 VIRUS INFECTION: Primary | ICD-10-CM

## 2021-10-27 DIAGNOSIS — Z20.822 SUSPECTED COVID-19 VIRUS INFECTION: ICD-10-CM

## 2021-10-27 NOTE — TELEPHONE ENCOUNTER
Results and RTW guidelines:    COVID RESULT:    [] Viewed by employee in [employer]. RTW plan and instructions as indicated on triage call. Manager notified. Estimated RTW date:   [x] Discussed with employee   [] Unable to reach by phone.   Sent via [employer] worsen                - If outside testing completed, bring a copy of result to RTW appointment      Notes:     RTW PLAN:    [] RTW 10 days with clearance from [address] [place] [address]- call for appt 1-2 days prior to RTW date OR when feeling well enough to RTW (see guidelines

## 2021-10-27 NOTE — TELEPHONE ENCOUNTER
Department: Geriatrics                                 [] [address] [facility]  [x]GABRIEL   [] [facility]    Dept Manager/Supervisor/team or clinical lead:  Dodie Garcia    Position:  [] MD     [] RN     [] Respiratory Therapist     [] PCT     [] PSR      [x] [facility] are you next scheduled to work? 10/30/2021    Did you have close contact with someone on your unit while not wearing a mask? (e.g., during meal breaks):  Yes []   No [x]    If yes, who:   Do you share a workspace? Yes [x]   No []       If yes, with whom?  Taylor Macias w/ [facility] 3-5 days after exposure.                                                  COVID-19 testing ordered: [x] Rapid    [] [facility]    Date test is to be taken:   10/27/2021  []  Outside testing       [x] Manager notified    INSTRUCTIONS PROVIDED:    [x]

## 2022-02-02 ENCOUNTER — HOSPITAL ENCOUNTER (OUTPATIENT)
Dept: GENERAL RADIOLOGY | Age: 27
Discharge: HOME | End: 2022-02-02
Attending: INTERNAL MEDICINE
Payer: COMMERCIAL

## 2022-02-02 ENCOUNTER — OFFICE VISIT (OUTPATIENT)
Dept: INTERNAL MEDICINE CLINIC | Facility: CLINIC | Age: 27
End: 2022-02-02
Payer: COMMERCIAL

## 2022-02-02 ENCOUNTER — LAB ENCOUNTER (OUTPATIENT)
Dept: LAB | Age: 27
End: 2022-02-02
Attending: INTERNAL MEDICINE
Payer: COMMERCIAL

## 2022-02-02 VITALS
DIASTOLIC BLOOD PRESSURE: 60 MMHG | WEIGHT: 174.19 LBS | BODY MASS INDEX: 31.25 KG/M2 | TEMPERATURE: 99 F | OXYGEN SATURATION: 97 % | HEIGHT: 62.5 IN | SYSTOLIC BLOOD PRESSURE: 98 MMHG | RESPIRATION RATE: 14 BRPM | HEART RATE: 84 BPM

## 2022-02-02 DIAGNOSIS — R74.8 ELEVATED LIVER ENZYMES: ICD-10-CM

## 2022-02-02 DIAGNOSIS — R07.81 RIB PAIN: ICD-10-CM

## 2022-02-02 DIAGNOSIS — M54.2 NECK PAIN: Primary | ICD-10-CM
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
## 2022-02-02 DIAGNOSIS — M54.2 NECK PAIN: ICD-10-CM

## 2022-02-02 DIAGNOSIS — M54.50 ACUTE LEFT-SIDED LOW BACK PAIN WITHOUT SCIATICA: ICD-10-CM

## 2022-02-02 LAB
ALBUMIN SERPL-MCNC: 4.1 G/DL (ref 3.4–5)
ALP LIVER SERPL-CCNC: 59 U/L
ALT SERPL-CCNC: 38 U/L
AST SERPL-CCNC: 21 U/L (ref 15–37)
BILIRUB DIRECT SERPL-MCNC: <0.1 MG/DL (ref 0–0.2)
BILIRUB SERPL-MCNC: 0.2 MG/DL (ref 0.1–2)
PROT SERPL-MCNC: 7.6 G/DL (ref 6.4–8.2)

## 2022-02-02 PROCEDURE — 99214 OFFICE O/P EST MOD 30 MIN: CPT | Performed by: INTERNAL MEDICINE

## 2022-02-02 PROCEDURE — 71101 X-RAY EXAM UNILAT RIBS/CHEST: CPT | Performed by: INTERNAL MEDICINE

## 2022-02-02 PROCEDURE — 72100 X-RAY EXAM L-S SPINE 2/3 VWS: CPT | Performed by: INTERNAL MEDICINE

## 2022-02-02 PROCEDURE — 72040 X-RAY EXAM NECK SPINE 2-3 VW: CPT | Performed by: INTERNAL MEDICINE

## 2022-02-02 PROCEDURE — 3008F BODY MASS INDEX DOCD: CPT | Performed by: INTERNAL MEDICINE

## 2022-02-02 PROCEDURE — 3074F SYST BP LT 130 MM HG: CPT | Performed by: INTERNAL MEDICINE

## 2022-02-02 PROCEDURE — 80076 HEPATIC FUNCTION PANEL: CPT | Performed by: INTERNAL MEDICINE

## 2022-02-02 PROCEDURE — 3078F DIAST BP <80 MM HG: CPT | Performed by: INTERNAL MEDICINE

## 2022-11-18 ENCOUNTER — TELEPHONE (OUTPATIENT)
Dept: INTERNAL MEDICINE CLINIC | Facility: CLINIC | Age: 27
End: 2022-11-18

## 2022-11-18 NOTE — TELEPHONE ENCOUNTER
LMOM to notify patient [facility] is not accepting [facility] as of 1/1/23

## 2024-06-20 ENCOUNTER — APPOINTMENT (OUTPATIENT)
Dept: GENERAL RADIOLOGY | Age: 29
End: 2024-06-20
Attending: STUDENT IN AN ORGANIZED HEALTH CARE EDUCATION/TRAINING PROGRAM

## 2024-06-20 ENCOUNTER — APPOINTMENT (OUTPATIENT)
Dept: CT IMAGING | Age: 29
End: 2024-06-20
Attending: STUDENT IN AN ORGANIZED HEALTH CARE EDUCATION/TRAINING PROGRAM

## 2024-06-20 ENCOUNTER — HOSPITAL ENCOUNTER (EMERGENCY)
Age: 29
Discharge: HOME | End: 2024-06-20
Attending: EMERGENCY MEDICINE

## 2024-06-20 VITALS
OXYGEN SATURATION: 100 % | RESPIRATION RATE: 16 BRPM | HEART RATE: 72 BPM | DIASTOLIC BLOOD PRESSURE: 62 MMHG | TEMPERATURE: 98.3 F | SYSTOLIC BLOOD PRESSURE: 115 MMHG

## 2024-06-20 DIAGNOSIS — T14.90XA TRAUMA: Primary | ICD-10-CM

## 2024-06-20 LAB
ABO + RH BLD: NORMAL
ANION GAP SERPL CALC-SCNC: 14 MMOL/L (ref 7–19)
APTT PPP: 30 SEC (ref 22–32)
BASOPHILS # BLD: 0.1 K/MCL (ref 0–0.3)
BASOPHILS NFR BLD: 1 %
BLD GP AB SCN SERPL QL GEL: NEGATIVE
BUN SERPL-MCNC: 12 MG/DL (ref 6–20)
BUN/CREAT SERPL: 12 (ref 7–25)
CALCIUM SERPL-MCNC: 9.4 MG/DL (ref 8.4–10.2)
CHLORIDE SERPL-SCNC: 102 MMOL/L (ref 97–110)
CO2 SERPL-SCNC: 26 MMOL/L (ref 21–32)
CREAT SERPL-MCNC: 1.01 MG/DL (ref 0.67–1.17)
DEPRECATED RDW RBC: 40.4 FL (ref 39–50)
EGFRCR SERPLBLD CKD-EPI 2021: >90 ML/MIN/{1.73_M2}
EOSINOPHIL # BLD: 0.3 K/MCL (ref 0–0.5)
EOSINOPHIL NFR BLD: 4 %
ERYTHROCYTE [DISTWIDTH] IN BLOOD: 12.3 % (ref 11–15)
ETHANOL SERPL-MCNC: NORMAL MG/DL
FASTING DURATION TIME PATIENT: NORMAL H
GLUCOSE SERPL-MCNC: 97 MG/DL (ref 70–99)
HCT VFR BLD CALC: 46.2 % (ref 39–51)
HGB BLD-MCNC: 15.2 G/DL (ref 13–17)
IMM GRANULOCYTES # BLD AUTO: 0.1 K/MCL (ref 0–0.2)
IMM GRANULOCYTES # BLD: 1 %
INR PPP: 0.9
LACTATE BLDV-SCNC: 1.5 MMOL/L (ref 0–2)
LYMPHOCYTES # BLD: 2.6 K/MCL (ref 1–4.8)
LYMPHOCYTES NFR BLD: 30 %
MCH RBC QN AUTO: 29.2 PG (ref 26–34)
MCHC RBC AUTO-ENTMCNC: 32.9 G/DL (ref 32–36.5)
MCV RBC AUTO: 88.7 FL (ref 78–100)
MONOCYTES # BLD: 0.9 K/MCL (ref 0.3–0.9)
MONOCYTES NFR BLD: 10 %
NEUTROPHILS # BLD: 4.8 K/MCL (ref 1.8–7.7)
NEUTROPHILS NFR BLD: 54 %
NRBC BLD MANUAL-RTO: 0 /100 WBC
PLATELET # BLD AUTO: 382 K/MCL (ref 140–450)
POTASSIUM SERPL-SCNC: 3.7 MMOL/L (ref 3.4–5.1)
PROTHROMBIN TIME: 10.3 SEC (ref 9.7–11.8)
RBC # BLD: 5.21 MIL/MCL (ref 4.5–5.9)
SODIUM SERPL-SCNC: 138 MMOL/L (ref 135–145)
TYPE AND SCREEN EXPIRATION DATE: NORMAL
WBC # BLD: 8.8 K/MCL (ref 4.2–11)

## 2024-06-20 PROCEDURE — 76705 ECHO EXAM OF ABDOMEN: CPT

## 2024-06-20 PROCEDURE — 10002807 HB RX 258: Performed by: STUDENT IN AN ORGANIZED HEALTH CARE EDUCATION/TRAINING PROGRAM

## 2024-06-20 PROCEDURE — 74177 CT ABD & PELVIS W/CONTRAST: CPT

## 2024-06-20 PROCEDURE — T1015 CLINIC SERVICE: HCPCS | Performed by: EMERGENCY MEDICINE

## 2024-06-20 PROCEDURE — 99285 EMERGENCY DEPT VISIT HI MDM: CPT

## 2024-06-20 PROCEDURE — 80048 BASIC METABOLIC PNL TOTAL CA: CPT | Performed by: STUDENT IN AN ORGANIZED HEALTH CARE EDUCATION/TRAINING PROGRAM

## 2024-06-20 PROCEDURE — 10003579 HB TRAUMA W/O CRITICAL CARE

## 2024-06-20 PROCEDURE — 36415 COLL VENOUS BLD VENIPUNCTURE: CPT

## 2024-06-20 PROCEDURE — 99245 OFF/OP CONSLTJ NEW/EST HI 55: CPT

## 2024-06-20 PROCEDURE — 73560 X-RAY EXAM OF KNEE 1 OR 2: CPT

## 2024-06-20 PROCEDURE — 72170 X-RAY EXAM OF PELVIS: CPT

## 2024-06-20 PROCEDURE — 85025 COMPLETE CBC W/AUTO DIFF WBC: CPT | Performed by: STUDENT IN AN ORGANIZED HEALTH CARE EDUCATION/TRAINING PROGRAM

## 2024-06-20 PROCEDURE — 10002805 HB CONTRAST AGENT: Performed by: STUDENT IN AN ORGANIZED HEALTH CARE EDUCATION/TRAINING PROGRAM

## 2024-06-20 PROCEDURE — 73030 X-RAY EXAM OF SHOULDER: CPT

## 2024-06-20 PROCEDURE — 71045 X-RAY EXAM CHEST 1 VIEW: CPT

## 2024-06-20 PROCEDURE — 82077 ASSAY SPEC XCP UR&BREATH IA: CPT | Performed by: STUDENT IN AN ORGANIZED HEALTH CARE EDUCATION/TRAINING PROGRAM

## 2024-06-20 PROCEDURE — 83605 ASSAY OF LACTIC ACID: CPT | Performed by: STUDENT IN AN ORGANIZED HEALTH CARE EDUCATION/TRAINING PROGRAM

## 2024-06-20 PROCEDURE — 85730 THROMBOPLASTIN TIME PARTIAL: CPT | Performed by: STUDENT IN AN ORGANIZED HEALTH CARE EDUCATION/TRAINING PROGRAM

## 2024-06-20 PROCEDURE — 93308 TTE F-UP OR LMTD: CPT

## 2024-06-20 PROCEDURE — 72125 CT NECK SPINE W/O DYE: CPT

## 2024-06-20 PROCEDURE — 73610 X-RAY EXAM OF ANKLE: CPT

## 2024-06-20 PROCEDURE — 76604 US EXAM CHEST: CPT

## 2024-06-20 PROCEDURE — 10004180 HB COUNTER-TRANSPORT

## 2024-06-20 PROCEDURE — 85610 PROTHROMBIN TIME: CPT | Performed by: STUDENT IN AN ORGANIZED HEALTH CARE EDUCATION/TRAINING PROGRAM

## 2024-06-20 PROCEDURE — 86850 RBC ANTIBODY SCREEN: CPT | Performed by: STUDENT IN AN ORGANIZED HEALTH CARE EDUCATION/TRAINING PROGRAM

## 2024-06-20 PROCEDURE — 70450 CT HEAD/BRAIN W/O DYE: CPT

## 2024-06-20 RX ORDER — SODIUM CHLORIDE, SODIUM GLUCONATE, SODIUM ACETATE, POTASSIUM CHLORIDE, MAGNESIUM CHLORIDE, SODIUM PHOSPHATE, DIBASIC, AND POTASSIUM PHOSPHATE .53; .5; .37; .037; .03; .012; .00082 G/100ML; G/100ML; G/100ML; G/100ML; G/100ML; G/100ML; G/100ML
INJECTION, SOLUTION INTRAVENOUS ONCE
Status: COMPLETED | OUTPATIENT
Start: 2024-06-20 | End: 2024-06-20

## 2024-06-20 RX ADMIN — SODIUM CHLORIDE, SODIUM GLUCONATE, SODIUM ACETATE, POTASSIUM CHLORIDE, MAGNESIUM CHLORIDE, SODIUM PHOSPHATE, DIBASIC, AND POTASSIUM PHOSPHATE: .53; .5; .37; .037; .03; .012; .00082 INJECTION, SOLUTION INTRAVENOUS at 18:47

## 2024-06-20 RX ADMIN — IOHEXOL 75 ML: 350 INJECTION, SOLUTION INTRAVENOUS at 19:07

## 2024-06-20 SDOH — SOCIAL STABILITY: SOCIAL INSECURITY: HOW OFTEN DOES ANYONE, INCLUDING FAMILY AND FRIENDS, THREATEN YOU WITH HARM?: NEVER

## 2024-06-20 SDOH — SOCIAL STABILITY: SOCIAL INSECURITY: HOW OFTEN DOES ANYONE, INCLUDING FAMILY AND FRIENDS, PHYSICALLY HURT YOU?: NEVER

## 2024-06-20 SDOH — SOCIAL STABILITY: SOCIAL INSECURITY: HOW OFTEN DOES ANYONE, INCLUDING FAMILY AND FRIENDS, SCREAM OR CURSE AT YOU?: NEVER

## 2024-06-20 SDOH — SOCIAL STABILITY: SOCIAL INSECURITY: HOW OFTEN DOES ANYONE, INCLUDING FAMILY AND FRIENDS, INSULT OR TALK DOWN TO YOU?: NEVER

## 2024-06-20 ASSESSMENT — PAIN SCALES - GENERAL
PAINLEVEL_OUTOF10: 7
PAINLEVEL_OUTOF10: 0

## 2024-06-21 LAB
RAINBOW EXTRA TUBES HOLD SPECIMEN: NORMAL
RAINBOW EXTRA TUBES HOLD SPECIMEN: NORMAL

## (undated) NOTE — ED AVS SNAPSHOT
[facility] Emergency Department    [place]  [address] [address]    Phone:  [phone]    Fax:  Damien Ortega   MRN: [id number]    Department:  [facility] Emergency Department   Date of Visit:  2 IF THERE IS ANY CHANGE OR WORSENING OF YOUR CONDITION, CALL YOUR PRIMARY CARE PHYSICIAN AT ONCE OR RETURN IMMEDIATELY TO THE EMERGENCY DEPARTMENT.     If you have been prescribed any medication(s), please fill your prescription right away and begin taking t

## (undated) NOTE — ED AVS SNAPSHOT
[facility] Emergency Department    [facility] [address]  [address] [address]    Phone:  [phone]    Fax:  Damien Ortega   MRN: [id number]    Department:  [facility] Emergency Department   Date of Visit:  2 Disclosure     Insurance plans vary and the physician(s) referred by the ER may not be covered by your plan. Please contact your insurance company to determine coverage for follow-up care and referrals.     [address] [phone] prescription right away and begin taking the medication(s) as directed    If the emergency physician has read X-rays, these will be re-interpreted by a radiologist.  If there is a significant change in your reading, you will be contacted.  Please make sure Medicaid plans. To get signed up and covered, please call [phone] and ask to get set up for an insurance coverage that is in-network with [facility]. Chris     Sign up for [employer], your secure online medical record.   [employer] [place]

## (undated) NOTE — LETTER
[address] [place], [address] [address]  [facility]: [phone]  FAX: [phone]      To whom it may concern:    Amandeep Maier was evaluated in our office for possible right ureteropelvic junct